# Patient Record
Sex: FEMALE | Race: WHITE
[De-identification: names, ages, dates, MRNs, and addresses within clinical notes are randomized per-mention and may not be internally consistent; named-entity substitution may affect disease eponyms.]

---

## 2020-10-22 ENCOUNTER — TRANSCRIPTION ENCOUNTER (OUTPATIENT)
Age: 25
End: 2020-10-22

## 2022-06-06 ENCOUNTER — NON-APPOINTMENT (OUTPATIENT)
Age: 27
End: 2022-06-06

## 2024-01-06 ENCOUNTER — EMERGENCY (EMERGENCY)
Facility: HOSPITAL | Age: 29
LOS: 0 days | Discharge: HOME | End: 2024-01-06
Attending: EMERGENCY MEDICINE
Payer: COMMERCIAL

## 2024-01-06 VITALS
SYSTOLIC BLOOD PRESSURE: 124 MMHG | RESPIRATION RATE: 18 BRPM | DIASTOLIC BLOOD PRESSURE: 70 MMHG | OXYGEN SATURATION: 100 % | HEART RATE: 81 BPM | TEMPERATURE: 99 F

## 2024-01-06 VITALS — HEIGHT: 64 IN | WEIGHT: 115.08 LBS

## 2024-01-06 DIAGNOSIS — Z01.89 ENCOUNTER FOR OTHER SPECIFIED SPECIAL EXAMINATIONS: ICD-10-CM

## 2024-01-06 PROCEDURE — 99284 EMERGENCY DEPT VISIT MOD MDM: CPT

## 2024-01-06 PROCEDURE — 99282 EMERGENCY DEPT VISIT SF MDM: CPT

## 2024-01-06 NOTE — ED STATDOCS - PHYSICAL EXAMINATION
Constitutional: NAD AOx3  Eyes: PERRL EOMI  Head: Normocephalic atraumatic  Mouth: MMM  Cardiac: regular rate and rhythm  Resp: Lungs CTAB  GI: Abd s/nd/nt  Neuro: CN2-12 grossly intact, DESAI x 4  Skin: No visible rashes  MSK: R thumb missing fake nail but native nail intact without any obvious lacerations or abrasions

## 2024-01-06 NOTE — ED STATDOCS - PROGRESS NOTE DETAILS
SIMON Keenan: I participated in the care of this patient. I agree with the history, physical and plan. SIMON Keenan: [facility] called and discussed case. Spoke to Radha Leonard, who does not recommend rabies vaccine at this time. Pt has racoon at her house, [facility] recommends that they will  racoon to test for rabies. explained plan to pt. Pt stable for AL. SIMON Keenan: [facility] called and discussed case. Spoke to Radha Leonard, who does not recommend rabies vaccine at this time. Pt has racoon at her house, [facility] recommends that they will  racoon to test for rabies. explained plan to pt. Pt stable for [place].

## 2024-01-06 NOTE — ED STATDOCS - NSFOLLOWUPINSTRUCTIONS_ED_ALL_ED_FT
Follow up with your pmd within 48 hours- show copies of ER results   Return to the ED if any worsening or persistent symptoms.

## 2024-01-06 NOTE — ED STATDOCS - PATIENT PORTAL LINK FT
You can access the FollowMyHealth Patient Portal offered by [facility] by registering at the following website: [email or web address]. By joining [facility]’s FollowMyHealth portal, you will also be able to view your health information using other applications (apps) compatible with our system. You can access the FollowMyHealth Patient Portal offered by [facility] by registering at the following website: [email or web address]. By joining [facility]’s FollowMyHealth portal, you will also be able to view your health information using other applications (apps) compatible with our system.

## 2024-01-06 NOTE — ED STATDOCS - OBJECTIVE STATEMENT
29 y/o F with no pertinent PMHx presents to the ED for rabies shot. Pt states her dog got into a fight with a raccoon and her dog got a bite on its ear. Pt states she was cleaning her dog and got saliva on her. Denies bite marks or interaction with the raccoon. Vaccines UTD. 27 y/o F with no pertinent PMHx presents to the ED for rabies shot. Pt states her dog got into a fight with a raccoon and her dog got a bite on its ear. Pt states she was cleaning her dog and got saliva on her. Denies bite marks or interaction with the raccoon. Vaccines UTD.

## 2024-01-06 NOTE — ED STATDOCS - CLINICAL SUMMARY MEDICAL DECISION MAKING FREE TEXT BOX
Plan to discuss with department of health regarding if her exposure necessitates need for rabies vaccine.

## 2024-01-06 NOTE — ED ADULT TRIAGE NOTE - CHIEF COMPLAINT QUOTE
Pt states her dog got into fight with raccoon, dog got bite on ear.  Pt states she was cleaning dog and got the saliva on her.  Denies any bite marks or interaction with raccoon.  Came for rabies shot
normal